# Patient Record
Sex: FEMALE | Race: WHITE | NOT HISPANIC OR LATINO | URBAN - NONMETROPOLITAN AREA
[De-identification: names, ages, dates, MRNs, and addresses within clinical notes are randomized per-mention and may not be internally consistent; named-entity substitution may affect disease eponyms.]

---

## 2021-07-05 ENCOUNTER — IMPORTED ENCOUNTER (OUTPATIENT)
Dept: URBAN - NONMETROPOLITAN AREA CLINIC 1 | Facility: CLINIC | Age: 86
End: 2021-07-05

## 2021-07-05 PROCEDURE — 99203 OFFICE O/P NEW LOW 30 MIN: CPT

## 2021-07-05 PROCEDURE — 92015 DETERMINE REFRACTIVE STATE: CPT

## 2021-07-05 NOTE — PATIENT DISCUSSION
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 Daniela kaba w/ Dr. Ramona Quintero as scheudled Pseudophakia OU -  Both intraocular lenses are stable and in place -  Continue to monitor Myopia OU Astigmatism OU w/ Presbyopia -  Discussed refractive status w/ patient -  New spectacle Rx issued -  Continue to monitor

## 2021-08-11 PROBLEM — H35.3211: Noted: 2021-08-11

## 2022-04-09 ASSESSMENT — VISUAL ACUITY
OD_CC: 20/70-
OS_CC: CF4'
OD_PH: 20/50+
OS_SC: 20/30

## 2022-04-09 ASSESSMENT — TONOMETRY
OS_IOP_MMHG: 14
OD_IOP_MMHG: 15